# Patient Record
Sex: MALE | Race: OTHER | HISPANIC OR LATINO | URBAN - METROPOLITAN AREA
[De-identification: names, ages, dates, MRNs, and addresses within clinical notes are randomized per-mention and may not be internally consistent; named-entity substitution may affect disease eponyms.]

---

## 2018-06-06 ENCOUNTER — EMERGENCY (EMERGENCY)
Facility: HOSPITAL | Age: 38
LOS: 1 days | Discharge: HOME | End: 2018-06-06
Attending: EMERGENCY MEDICINE | Admitting: EMERGENCY MEDICINE
Payer: SELF-PAY

## 2018-06-06 VITALS
SYSTOLIC BLOOD PRESSURE: 142 MMHG | DIASTOLIC BLOOD PRESSURE: 96 MMHG | OXYGEN SATURATION: 99 % | RESPIRATION RATE: 16 BRPM | HEART RATE: 75 BPM | TEMPERATURE: 98 F

## 2018-06-06 LAB
ALBUMIN SERPL ELPH-MCNC: 4.4 G/DL — SIGNIFICANT CHANGE UP (ref 3.3–5)
ALP SERPL-CCNC: 48 U/L — SIGNIFICANT CHANGE UP (ref 40–120)
ALT FLD-CCNC: 15 U/L — SIGNIFICANT CHANGE UP (ref 4–41)
AST SERPL-CCNC: 20 U/L — SIGNIFICANT CHANGE UP (ref 4–40)
BASOPHILS # BLD AUTO: 0.04 K/UL — SIGNIFICANT CHANGE UP (ref 0–0.2)
BASOPHILS NFR BLD AUTO: 0.7 % — SIGNIFICANT CHANGE UP (ref 0–2)
BILIRUB SERPL-MCNC: 0.3 MG/DL — SIGNIFICANT CHANGE UP (ref 0.2–1.2)
BUN SERPL-MCNC: 15 MG/DL — SIGNIFICANT CHANGE UP (ref 7–23)
CALCIUM SERPL-MCNC: 9.2 MG/DL — SIGNIFICANT CHANGE UP (ref 8.4–10.5)
CHLORIDE SERPL-SCNC: 100 MMOL/L — SIGNIFICANT CHANGE UP (ref 98–107)
CK MB BLD-MCNC: 1.83 NG/ML — SIGNIFICANT CHANGE UP (ref 1–6.6)
CK MB BLD-MCNC: SIGNIFICANT CHANGE UP (ref 0–2.5)
CK SERPL-CCNC: 146 U/L — SIGNIFICANT CHANGE UP (ref 30–200)
CO2 SERPL-SCNC: 25 MMOL/L — SIGNIFICANT CHANGE UP (ref 22–31)
CREAT SERPL-MCNC: 0.94 MG/DL — SIGNIFICANT CHANGE UP (ref 0.5–1.3)
D DIMER BLD IA.RAPID-MCNC: < 150 NG/ML — SIGNIFICANT CHANGE UP
EOSINOPHIL # BLD AUTO: 0.09 K/UL — SIGNIFICANT CHANGE UP (ref 0–0.5)
EOSINOPHIL NFR BLD AUTO: 1.6 % — SIGNIFICANT CHANGE UP (ref 0–6)
GLUCOSE SERPL-MCNC: 101 MG/DL — HIGH (ref 70–99)
HCT VFR BLD CALC: 43.9 % — SIGNIFICANT CHANGE UP (ref 39–50)
HGB BLD-MCNC: 14.6 G/DL — SIGNIFICANT CHANGE UP (ref 13–17)
IMM GRANULOCYTES # BLD AUTO: 0.02 # — SIGNIFICANT CHANGE UP
IMM GRANULOCYTES NFR BLD AUTO: 0.4 % — SIGNIFICANT CHANGE UP (ref 0–1.5)
LYMPHOCYTES # BLD AUTO: 1.76 K/UL — SIGNIFICANT CHANGE UP (ref 1–3.3)
LYMPHOCYTES # BLD AUTO: 30.9 % — SIGNIFICANT CHANGE UP (ref 13–44)
MCHC RBC-ENTMCNC: 29.7 PG — SIGNIFICANT CHANGE UP (ref 27–34)
MCHC RBC-ENTMCNC: 33.3 % — SIGNIFICANT CHANGE UP (ref 32–36)
MCV RBC AUTO: 89.4 FL — SIGNIFICANT CHANGE UP (ref 80–100)
MONOCYTES # BLD AUTO: 0.47 K/UL — SIGNIFICANT CHANGE UP (ref 0–0.9)
MONOCYTES NFR BLD AUTO: 8.3 % — SIGNIFICANT CHANGE UP (ref 2–14)
NEUTROPHILS # BLD AUTO: 3.31 K/UL — SIGNIFICANT CHANGE UP (ref 1.8–7.4)
NEUTROPHILS NFR BLD AUTO: 58.1 % — SIGNIFICANT CHANGE UP (ref 43–77)
NRBC # FLD: 0 — SIGNIFICANT CHANGE UP
PLATELET # BLD AUTO: 187 K/UL — SIGNIFICANT CHANGE UP (ref 150–400)
PMV BLD: 12 FL — SIGNIFICANT CHANGE UP (ref 7–13)
POTASSIUM SERPL-MCNC: 4.2 MMOL/L — SIGNIFICANT CHANGE UP (ref 3.5–5.3)
POTASSIUM SERPL-SCNC: 4.2 MMOL/L — SIGNIFICANT CHANGE UP (ref 3.5–5.3)
PROT SERPL-MCNC: 7.7 G/DL — SIGNIFICANT CHANGE UP (ref 6–8.3)
RBC # BLD: 4.91 M/UL — SIGNIFICANT CHANGE UP (ref 4.2–5.8)
RBC # FLD: 13.2 % — SIGNIFICANT CHANGE UP (ref 10.3–14.5)
SODIUM SERPL-SCNC: 138 MMOL/L — SIGNIFICANT CHANGE UP (ref 135–145)
TROPONIN T SERPL-MCNC: < 0.06 NG/ML — SIGNIFICANT CHANGE UP (ref 0–0.06)
WBC # BLD: 5.69 K/UL — SIGNIFICANT CHANGE UP (ref 3.8–10.5)
WBC # FLD AUTO: 5.69 K/UL — SIGNIFICANT CHANGE UP (ref 3.8–10.5)

## 2018-06-06 PROCEDURE — 73630 X-RAY EXAM OF FOOT: CPT | Mod: 26,LT

## 2018-06-06 PROCEDURE — 99285 EMERGENCY DEPT VISIT HI MDM: CPT

## 2018-06-06 PROCEDURE — 93971 EXTREMITY STUDY: CPT | Mod: 26,LT

## 2018-06-06 PROCEDURE — 71046 X-RAY EXAM CHEST 2 VIEWS: CPT | Mod: 26

## 2018-06-06 RX ORDER — ASPIRIN/CALCIUM CARB/MAGNESIUM 324 MG
162 TABLET ORAL ONCE
Qty: 0 | Refills: 0 | Status: COMPLETED | OUTPATIENT
Start: 2018-06-06 | End: 2018-06-06

## 2018-06-06 RX ORDER — IBUPROFEN 200 MG
600 TABLET ORAL ONCE
Qty: 0 | Refills: 0 | Status: COMPLETED | OUTPATIENT
Start: 2018-06-06 | End: 2018-06-06

## 2018-06-06 RX ADMIN — Medication 162 MILLIGRAM(S): at 20:10

## 2018-06-06 RX ADMIN — Medication 600 MILLIGRAM(S): at 21:15

## 2018-06-06 NOTE — ED PROVIDER NOTE - MEDICAL DECISION MAKING DETAILS
37M p/w L foot swelling x 2 days.  No injury that he can remember.  No fever.  Swelling is located at L great toe.  Never happened before.  Having trouble walking on it.  No recent travel.  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  Woke up with it.  Pt also has been getting chest pain, L thoracic pain rad to chest x months, has not sought medical evaluation for it.  Same pain he's been having for month, no injury there.  Hurts when he takes a deep breath.  Pt took aleve yesterday for the pain, nothing today.  No SCD in family or CAD in 1st deg relatives.  Plan L toe likely c/w gout, no warmth c/w infection.  Given pain rad up leg will check labs and DVT study as well as L great toe xray eval for fx/mass.  Rx asa for now, motrin if Cr allows.  WIll check xray and EKG, CE, ddimer given pleuritic CP.  Concerning story of pain rad to back however chronic x years and stable.  Low risk for ACS - if ce neg, w/u negative, will rx NSAIDS for gout and f/u PMD/clinic.  EKG SR at 71 , no PIYUSH no STD.  Slight IVCD V2.

## 2018-06-06 NOTE — ED PROVIDER NOTE - OBJECTIVE STATEMENT
37M p/w L foot swelling x 2 days.  No injury that he can remember.  No fever.  Swelling is located at L great toe.  Never happened before.  Having trouble walking on it.  No recent travel.  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  Pt also has been getting chest pain, L thoracic pain rad to chest x months, has not sought medical evaluation for it.  Same pain he's been having for month, no injury there.  Hurts when he takes a deep breath.  Pt took aleve yesterday for the pain, nothing today.  No SCD in family or CAD in 1st deg relatives.  Plan L toe likely c/w gout, no warmth c/w infection.  Given pain rad up leg will check labs and DVT study as well as L great toe xray eval for fx/mass.  Rx asa for now, motrin if Cr allows.  WIll check xray and EKG, CE, ddimer given pleuritic CP.  Concerning story of pain rad to back however chronic x years and stable.  Low risk for ACS - if ce neg, w/u negative, will rx NSAIDS for gout and f/u PMD/clinic.  EKG SR at 71 , no PIYUSH no STD.  Slight IVCD V2.    PMHX - none  meds - none  PSHX - RLE laceration repair  no T  All none 37M p/w L foot swelling x 2 days.  No injury that he can remember.  No fever.  Swelling is located at L great toe.  Never happened before.  Having trouble walking on it.  No recent travel.  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  Woke up with it.  Pt also has been getting chest pain, L thoracic pain rad to chest x months, has not sought medical evaluation for it.  Same pain he's been having for month, no injury there.  Hurts when he takes a deep breath.  Pt took aleve yesterday for the pain, nothing today.  No SCD in family or CAD in 1st deg relatives.  Plan L toe likely c/w gout, no warmth c/w infection.  Given pain rad up leg will check labs and DVT study as well as L great toe xray eval for fx/mass.  Rx asa for now, motrin if Cr allows.  WIll check xray and EKG, CE, ddimer given pleuritic CP.  Concerning story of pain rad to back however chronic x years and stable.  Low risk for ACS - if ce neg, w/u negative, will rx NSAIDS for gout and f/u PMD/clinic.  EKG SR at 71 , no PIYUSH no STD.  Slight IVCD V2.    PMHX - none  meds - none  PSHX - RLE laceration repair  no T  All none  VS:  unremarkable    GEN - NAD; well appearing; A+O x3   HEAD - NC/AT     ENT - PEERL, EOMI, mucous membranes  moist , no discharge      NECK: Neck supple, non-tender without lymphadenopathy, no masses, no JVD  PULM - CTA b/l,  symmetric breath sounds  COR -  normal heart sounds    ABD - , ND, NT, soft, no guarding, no rebound, no masses    BACK - no CVA tenderness, nontender spine     EXTREMS - no edema, no deformity, warm and well perfused .  LLE not swollen.  Mild swelling and mild TTP L great toe, no warmth.  Skin intact.  No midfoot swelling.   Distal N/V/T intact.   SKIN - no rash or bruising      NEUROLOGIC - alert, CN 2-12 intact, sensation nl, motor 5/5 RUE/LUE/RLE/LLE.

## 2018-06-06 NOTE — ED ADULT NURSE NOTE - OBJECTIVE STATEMENT
Patient received AA&Ox3 c/o chest pain that radiates to left side and worsens upon inspiration with no relief when taking aleve - this pain started yesterday. Pt. also c/o swelling to left big toe but denies trauma to area. VSS on RA. Patient denies dyspnea, dizziness, SOB, fever, chills at this time. 20g PIV in place to right AC, labs drawn, medication administered per orders, NAD noted - will continue to monitor.

## 2018-06-06 NOTE — ED PROVIDER NOTE - PROGRESS NOTE DETAILS
labs, US, dimer, xray, all wnl.  d/c home on motrin for presumed gout f/u PMD/ clinic labs, US, dimer, xray, all wnl.  d/c home on motrin for presumed gout f/u PMD/ clinic.  I removed pts IV

## 2018-06-06 NOTE — ED ADULT TRIAGE NOTE - CHIEF COMPLAINT QUOTE
pt c/o chest pain radiating to back x 2 hrs. also c/o atraumatic left foot pain and swelling x 2 days. denies fevers. no pmhx

## 2018-06-06 NOTE — ED PROVIDER NOTE - PHYSICAL EXAMINATION
VS:  unremarkable    GEN - NAD; well appearing; A+O x3   HEAD - NC/AT     ENT - PEERL, EOMI, mucous membranes  moist , no discharge      NECK: Neck supple, non-tender without lymphadenopathy, no masses, no JVD  PULM - CTA b/l,  symmetric breath sounds  COR -  normal heart sounds    ABD - , ND, NT, soft, no guarding, no rebound, no masses    BACK - no CVA tenderness, nontender spine     EXTREMS - no edema, no deformity, warm and well perfused .  LLE not swollen.  Mild swelling and mild TTP L great toe, no warmth.  Skin intact.  No midfoot swelling.   Distal N/V/T intact.   SKIN - no rash or bruising      NEUROLOGIC - alert, CN 2-12 intact, sensation nl, motor 5/5 RUE/LUE/RLE/LLE.

## 2018-06-06 NOTE — ED ADULT NURSE NOTE - CHIEF COMPLAINT
Paperwork from [facility] for [employer] was faxed to our office last week Thursday or Friday.  Milagro is calling as to what the status of paperwork is, if it has been filled out.  She needs this form filled out for therapy for her .     The patient is a 37y Male complaining of

## 2023-06-26 NOTE — ED PROVIDER NOTE - CROS ED CARDIOVAS ALL NEG
Plan: Will continue for 12 week and then stop
Initiate Treatment: Tri-Antoinette 0.01 %-4 %-0.05 % topical cream QH\\nSig: Apply at bedtime to discolored skin 12 weeks discontinue after that. Avoid sunlight.
Detail Level: Zone
Plan: Will start to back off on the clobetasol to prn
Continue Regimen: Clindagel 1 % topical gel, once daily \\nSig: Apply a thin layer twice a day to neck and scalp\\n\\nclobetasol 0.05 % scalp solution QHS\\nSig: Apply 2 to 3 dropped affected areas on the back of scalp twice when flared
- - -